# Patient Record
Sex: MALE | Race: WHITE
[De-identification: names, ages, dates, MRNs, and addresses within clinical notes are randomized per-mention and may not be internally consistent; named-entity substitution may affect disease eponyms.]

---

## 2018-09-04 ENCOUNTER — HOSPITAL ENCOUNTER (EMERGENCY)
Dept: HOSPITAL 80 - FED | Age: 23
Discharge: HOME | End: 2018-09-04
Payer: MEDICAID

## 2018-09-04 VITALS — SYSTOLIC BLOOD PRESSURE: 114 MMHG | DIASTOLIC BLOOD PRESSURE: 61 MMHG

## 2018-09-04 DIAGNOSIS — E86.9: ICD-10-CM

## 2018-09-04 DIAGNOSIS — F17.200: ICD-10-CM

## 2018-09-04 DIAGNOSIS — F16.10: Primary | ICD-10-CM

## 2018-09-04 LAB
CK SERPL-CCNC: 124 IU/L (ref 0–224)
PLATELET # BLD: 329 10^3/UL (ref 150–400)

## 2018-09-04 NOTE — EDPHY
H & P


Stated Complaint: POSSIBLY TOOK 25I NBOME @ 1930 THEN XANAX 2130


Time Seen by Provider: 09/04/18 21:40


HPI/ROS: 





CHIEF COMPLAINT: "I think I took 25-I"





HISTORY OF PRESENT ILLNESS:  23-year-old male history of chronic hallucinogenic 

use arrives by ambulance bleeding that he brought "regular acid" but believes 

he took instead "25-I".  He did not like the way was making him feel and 

subsequently took a Xanax tablet that he keeps as a rescue medication.  He is 

currently feeling more calm does note that earlier he has significant more 

agitated.  He denies suicidal or homicidal ideations or attempt.  Denies 

alcohol use.





PRIMARY CARE PROVIDER:  





REVIEW OF SYSTEMS:


10 systems reviewed and negative with the exception of the elements mentioned 

in the history of present illness








PAST MEDICAL & SURGICAL  HISTORY:  No pertinent medical or surgical history    





SOCIAL HISTORY: Positive for polysubstance abuse    





FAMILY HISTORY: No pertinent family history    








************


PHYSICAL EXAM





(Prior to examination, patient consented to physical exam, hands were washed 

and my usual and customary physical exam procedures followed)


1) GENERAL: Well-developed, well-nourished, alert and oriented.  Appears anxious


2) HEAD: Normocephalic, atraumatic


3) HEENT: Pupils equal, round, reactive to light bilaterally.  Sclera 

anicteric.  Nasopharynx, oropharynx, clear, no lesions.  Dry mucous membranes.


4) NECK: Full range of motion, no meningeal signs.


5) LUNGS: Clear auscultation bilaterally, no wheezes, no rhonchi, no 

retractions.   


6) HEART: Regular rate and rhythm, no murmur, no heave, no gallop.


7) ABDOMEN: No guarding, no rebound, no focal tenderness, negative McBurney's, 

negative Garcia's, negative Rovsing's, negative peritoneal sign,


8) MUSCULOSKELETAL: Moving all extremities, no focal areas of tenderness, no 

obvious trauma.  No peripheral edema or discoloration.


9) BACK: No CVA tenderness, no midline vertebral tenderness, no fluctuance, no 

step-off, no obvious trauma, no visual or palpable abnormality. 


10) SKIN: No rash, no petechiae. 


11) Psychiatric:  Patient is oriented X 3, there is no agitation.








***************





DIFFERENTIAL DIAGNOSIS:  In no particular order including but not limited to 

rhabdomyolysis, acute kidney injury secondary to illicit drug use, illicit drug 

use  








- Personal History


Current Tetanus/Diphtheria Vaccine: Yes


Current Tetanus Diphtheria and Acellular Pertussis (TDAP): Yes





- Medical/Surgical History


Hx Asthma: No


Hx Chronic Respiratory Disease: No


Hx Diabetes: No


Hx Cardiac Disease: No


Hx Renal Disease: No


Hx Cirrhosis: No


Hx Alcoholism: No


Hx HIV/AIDS: No


Hx Splenectomy or Spleen Trauma: No


Other PMH: DENIES





- Social History


Smoking Status: Current every day smoker


Constitutional: 


 Initial Vital Signs











Temperature (C)  37.2 C   09/04/18 21:37


 


Heart Rate  124 H  09/04/18 21:37


 


Respiratory Rate  18   09/04/18 21:37


 


Blood Pressure  149/78 H  09/04/18 21:37


 


O2 Sat (%)  93   09/04/18 21:37








 











O2 Delivery Mode               Room Air


 


O2 (L/minute)                  2














Allergies/Adverse Reactions: 


 





No Known Allergies Allergy (Unverified 09/04/18 21:41)


 








Home Medications: 














 Medication  Instructions  Recorded


 


NK [No Known Home Meds]  09/04/18














Medical Decision Making


ED Course/Re-evaluation: 





9:47 p.m.:  Patient will be observed in the ER for period of time, will be 

given IV hydration, IV benzodiazepine, will check laboratory studies including 

CK, creatinine and and monitor patient.  I saw this patient independently based 

on established practice protocols.  Care of patient under supervision of  

secondary supervising physician Dr Ace with whom I discussed case.





10:30 p.m.:  Re-evaluation no evidence of acute kidney injury no evidence of 

rhabdomyolysis.  He is resting comfortably appears comfortable.





10:59 p.m.:  Patient walking on emergency department answering questions 

appropriately comp cooperative alert oriented person place time events, clear 

speech pattern.  He has no clinical evidence of intoxication.  I believe him to 

have decision-making capacity at this time.  He would like to leave.  I 

recommended he stay in the ER for further period of observation however states 

that he has place to go and would like to leave.  He is therefore discharged 

from the emergency department.  Discharged with my usual and customary 

discharge precautions and instructions.  Denies suicidal or homicidal ideation  

Recommend avoidance of illicit drugs in the future.





- Data Points


Laboratory Results: 


 Laboratory Results





 09/04/18 21:40 





 09/04/18 21:40 





 











  09/04/18 09/04/18





  21:40 21:40


 


WBC    11.17 10^3/uL H 10^3/uL





    (3.80-9.50) 


 


RBC    5.61 10^6/uL 10^6/uL





    (4.40-6.38) 


 


Hgb    17.1 g/dL g/dL





    (13.7-17.5) 


 


Hct    51.0 % %





    (40.0-51.0) 


 


MCV    90.9 fL fL





    (81.5-99.8) 


 


MCH    30.5 pg pg





    (27.9-34.1) 


 


MCHC    33.5 g/dL g/dL





    (32.4-36.7) 


 


RDW    12.8 % %





    (11.5-15.2) 


 


Plt Count    329 10^3/uL 10^3/uL





    (150-400) 


 


MPV    10.3 fL fL





    (8.7-11.7) 


 


Neut % (Auto)    54.1 % %





    (39.3-74.2) 


 


Lymph % (Auto)    36.2 % %





    (15.0-45.0) 


 


Mono % (Auto)    7.4 % %





    (4.5-13.0) 


 


Eos % (Auto)    1.2 % %





    (0.6-7.6) 


 


Baso % (Auto)    0.6 % %





    (0.3-1.7) 


 


Nucleat RBC Rel Count    0.0 % %





    (0.0-0.2) 


 


Absolute Neuts (auto)    6.04 10^3/uL 10^3/uL





    (1.70-6.50) 


 


Absolute Lymphs (auto)    4.04 10^3/uL H 10^3/uL





    (1.00-3.00) 


 


Absolute Monos (auto)    0.83 10^3/uL H 10^3/uL





    (0.30-0.80) 


 


Absolute Eos (auto)    0.13 10^3/uL 10^3/uL





    (0.03-0.40) 


 


Absolute Basos (auto)    0.07 10^3/uL 10^3/uL





    (0.02-0.10) 


 


Absolute Nucleated RBC    0.00 10^3/uL 10^3/uL





    (0-0.01) 


 


Immature Gran %    0.5 % %





    (0.0-1.1) 


 


Immature Gran #    0.06 10^3/uL 10^3/uL





    (0.00-0.10) 


 


Sodium  138 mEq/L mEq/L  





   (135-145)  


 


Potassium  3.6 mEq/L mEq/L  





   (3.3-5.0)  


 


Chloride  99 mEq/L mEq/L  





   ()  


 


Carbon Dioxide  12 mEq/l L mEq/l  





   (22-31)  


 


Anion Gap  27 mEq/L H mEq/L  





   (8-16)  


 


BUN  11 mg/dL mg/dL  





   (7-23)  


 


Creatinine  1.1 mg/dL mg/dL  





   (0.7-1.3)  


 


Estimated GFR  > 60   





   


 


Glucose  165 mg/dL H mg/dL  





   ()  


 


Calcium  9.7 mg/dL mg/dL  





   (8.5-10.4)  


 


Creatine Kinase  124 IU/L IU/L  





   (0-224)  











Medications Given: 


 








Discontinued Medications





Sodium Chloride (Ns)  1,000 mls @ 0 mls/hr IV EDNOW ONE; Wide Open


   PRN Reason: Protocol


   Stop: 09/04/18 21:43


   Last Admin: 09/04/18 21:43 Dose:  1,000 mls


Sodium Chloride (Ns)  1,000 mls @ 0 mls/hr IV ONCE ONE


   PRN Reason: Wide Open


   Stop: 09/04/18 21:45


   Last Admin: 09/04/18 22:26 Dose:  1,000 mls








Departure





- Departure


Disposition: Home, Routine, Self-Care


Clinical Impression: 


 Hallucinogen abuse





Condition: Good


Instructions:  Polysubstance Abuse (ED)


Additional Instructions: 


Do not use drugs in the future


Referrals: 


[facility],. [Clinic] - 1-2 days without fail

## 2018-09-06 NOTE — CPEKG
Test Reason : OPEN

Blood Pressure : ***/*** mmHG

Vent. Rate : 124 BPM     Atrial Rate : 124 BPM

   P-R Int : 146 ms          QRS Dur : 099 ms

    QT Int : 333 ms       P-R-T Axes : 064 024 033 degrees

   QTc Int : 479 ms

 

Sinus tachycardia

Borderline prolonged QT interval

 

Confirmed by Nydia Calloway ([id number]) on 9/6/2018 2:56:17 PM

 

Referred By:             Confirmed By:Nydia Calloway